# Patient Record
(demographics unavailable — no encounter records)

---

## 2024-12-08 NOTE — PHYSICAL EXAM
[Normal Appearance] : normal appearance [Well Groomed] : well groomed [General Appearance - In No Acute Distress] : no acute distress [Edema] : no peripheral edema [Respiration, Rhythm And Depth] : normal respiratory rhythm and effort [Exaggerated Use Of Accessory Muscles For Inspiration] : no accessory muscle use [Abdomen Soft] : soft [Abdomen Tenderness] : non-tender [Urinary Bladder Findings] : the bladder was normal on palpation [Costovertebral Angle Tenderness] : no ~M costovertebral angle tenderness [Normal Station and Gait] : the gait and station were normal for the patient's age [] : no rash [No Focal Deficits] : no focal deficits [Oriented To Time, Place, And Person] : oriented to person, place, and time [Affect] : the affect was normal [Mood] : the mood was normal

## 2024-12-08 NOTE — HISTORY OF PRESENT ILLNESS
[FreeTextEntry1] : 43 yo F with LUTS and pelvic pressure Symptoms first developed in early Oct along with dysuria Went to gyn - was told UTI and took course of abx x7 days Still with persistent urinary straining and urinary urgency Voiding every 1 hour, no nocturia no gross hematuria Drinks 600ml of water, 1 cup of coffee, 3 cups of tea (black or green) chronic constipation LMP = last week sexually active but has been uncomfortable lately 1 child,  usually 1-2 UTIs per year US 24 at [facility] - PVR 4ml, no stones

## 2024-12-08 NOTE — ASSESSMENT
[FreeTextEntry1] : 43 yo F with LUTS, dysuria  - REviewed renal US from 11/18/24 through [facility] portal and confirmed findings as stated above - Discussed possible etiologies for LUTS. Discussed ways to manage them including behavioral modifications such as adequate hydration, controlling constipation, restricting fluids in the evening. Emphasized cutting back on caffeinated drinks - UA, culture, ureaplasma/mycoplasma

## 2024-12-08 NOTE — ASSESSMENT
[FreeTextEntry1] : 41 yo F with LUTS, dysuria  - REviewed renal US from 11/18/24 through [facility] portal and confirmed findings as stated above - Discussed possible etiologies for LUTS. Discussed ways to manage them including behavioral modifications such as adequate hydration, controlling constipation, restricting fluids in the evening. Emphasized cutting back on caffeinated drinks - UA, culture, ureaplasma/mycoplasma